# Patient Record
Sex: MALE | Race: WHITE | URBAN - METROPOLITAN AREA
[De-identification: names, ages, dates, MRNs, and addresses within clinical notes are randomized per-mention and may not be internally consistent; named-entity substitution may affect disease eponyms.]

---

## 2021-04-12 ENCOUNTER — OFFICE VISIT (OUTPATIENT)
Dept: URBAN - METROPOLITAN AREA CLINIC 44 | Facility: CLINIC | Age: 76
End: 2021-04-12
Payer: MEDICARE

## 2021-04-12 DIAGNOSIS — H04.123 DRY EYE SYNDROME OF BILATERAL LACRIMAL GLANDS: Primary | ICD-10-CM

## 2021-04-12 DIAGNOSIS — H52.4 PRESBYOPIA: ICD-10-CM

## 2021-04-12 DIAGNOSIS — H26.492 OTHER SECONDARY CATARACT, LEFT EYE: ICD-10-CM

## 2021-04-12 DIAGNOSIS — H43.813 VITREOUS DEGENERATION, BILATERAL: ICD-10-CM

## 2021-04-12 DIAGNOSIS — Z96.1 PRESENCE OF PSEUDOPHAKIA: ICD-10-CM

## 2021-04-12 PROCEDURE — 92134 CPTRZ OPH DX IMG PST SGM RTA: CPT | Performed by: OPTOMETRIST

## 2021-04-12 PROCEDURE — 92004 COMPRE OPH EXAM NEW PT 1/>: CPT | Performed by: OPTOMETRIST

## 2021-04-12 ASSESSMENT — KERATOMETRY
OS: 42.13
OD: 42.38

## 2021-04-12 ASSESSMENT — INTRAOCULAR PRESSURE
OS: 12
OD: 12

## 2021-04-12 ASSESSMENT — VISUAL ACUITY
OS: 20/30
OD: 20/25

## 2021-04-12 NOTE — IMPRESSION/PLAN
Impression: Other secondary cataract, left eye: H26.492. Plan: Not visually significant. RTC if notice changes in vision. Monitor.

## 2021-04-12 NOTE — IMPRESSION/PLAN
Impression: Vitreous degeneration, bilateral: H43.813. Plan: Patient educated on findings. Retina attached 360 RTC asap if notice symptoms of RD (reviewed with patient)

## 2021-04-12 NOTE — IMPRESSION/PLAN
Impression: Dry eye syndrome of bilateral lacrimal glands: H04.123. Plan: Recommend AT's (Ex. Systane Complete or Refresh) QID or more.

## 2022-06-23 ENCOUNTER — OFFICE VISIT (OUTPATIENT)
Dept: URBAN - METROPOLITAN AREA CLINIC 44 | Facility: CLINIC | Age: 77
End: 2022-06-23
Payer: MEDICARE

## 2022-06-23 DIAGNOSIS — H57.12 OCULAR PAIN, LEFT EYE: Primary | ICD-10-CM

## 2022-06-23 PROCEDURE — 92134 CPTRZ OPH DX IMG PST SGM RTA: CPT | Performed by: OPTOMETRIST

## 2022-06-23 PROCEDURE — 99213 OFFICE O/P EST LOW 20 MIN: CPT | Performed by: OPTOMETRIST

## 2022-06-23 ASSESSMENT — INTRAOCULAR PRESSURE
OS: 12
OD: 11

## 2022-06-23 NOTE — IMPRESSION/PLAN
Impression: Ocular pain, left eye: H57.12. Plan: Dull achy pain left x 4 days. Ocular health wnl (IOP wnl, no optic nerve edema/inflammation, no ischemia, no sign of orbital inflammation. ..etc). Symptoms do not appear to be eye or vision-related. Recommend see PCP for further evaluation.

## 2022-09-23 ENCOUNTER — OFFICE VISIT (OUTPATIENT)
Dept: URBAN - METROPOLITAN AREA CLINIC 44 | Facility: CLINIC | Age: 77
End: 2022-09-23
Payer: MEDICARE

## 2022-09-23 DIAGNOSIS — H26.492 OTHER SECONDARY CATARACT, LEFT EYE: ICD-10-CM

## 2022-09-23 DIAGNOSIS — H52.4 PRESBYOPIA: ICD-10-CM

## 2022-09-23 DIAGNOSIS — H43.813 VITREOUS DEGENERATION, BILATERAL: ICD-10-CM

## 2022-09-23 DIAGNOSIS — Z96.1 PRESENCE OF INTRAOCULAR LENS: ICD-10-CM

## 2022-09-23 DIAGNOSIS — H04.123 TEAR FILM INSUFFICIENCY OF BILATERAL LACRIMAL GLANDS: Primary | ICD-10-CM

## 2022-09-23 PROCEDURE — 99214 OFFICE O/P EST MOD 30 MIN: CPT | Performed by: OPTOMETRIST

## 2022-09-23 PROCEDURE — 92134 CPTRZ OPH DX IMG PST SGM RTA: CPT | Performed by: OPTOMETRIST

## 2022-09-23 ASSESSMENT — VISUAL ACUITY
OS: 20/30
OD: 20/25

## 2022-09-23 ASSESSMENT — INTRAOCULAR PRESSURE
OD: 10
OS: 11

## 2022-09-23 ASSESSMENT — KERATOMETRY
OS: 41.63
OD: 42.88